# Patient Record
Sex: MALE | Race: WHITE | NOT HISPANIC OR LATINO | Employment: OTHER | URBAN - METROPOLITAN AREA
[De-identification: names, ages, dates, MRNs, and addresses within clinical notes are randomized per-mention and may not be internally consistent; named-entity substitution may affect disease eponyms.]

---

## 2019-02-02 ENCOUNTER — HOSPITAL ENCOUNTER (EMERGENCY)
Facility: HOSPITAL | Age: 39
Discharge: HOME | End: 2019-02-02
Attending: EMERGENCY MEDICINE | Admitting: EMERGENCY MEDICINE

## 2019-02-02 VITALS
HEART RATE: 72 BPM | DIASTOLIC BLOOD PRESSURE: 84 MMHG | OXYGEN SATURATION: 99 % | RESPIRATION RATE: 14 BRPM | TEMPERATURE: 98.3 F | SYSTOLIC BLOOD PRESSURE: 147 MMHG

## 2019-02-02 DIAGNOSIS — S61.412A LACERATION OF LEFT HAND: Primary | ICD-10-CM

## 2019-02-02 PROCEDURE — 99282 EMERGENCY DEPT VISIT SF MDM: CPT

## 2019-02-02 PROCEDURE — 90715 TDAP VACCINE 7 YRS/> IM: CPT | Performed by: PHYSICIAN ASSISTANT

## 2019-02-02 PROCEDURE — 90471 IMMUNIZATION ADMIN: CPT

## 2019-02-02 RX ORDER — LIDOCAINE HYDROCHLORIDE 20 MG/ML
5 INJECTION, SOLUTION EPIDURAL; INFILTRATION; INTRACAUDAL; PERINEURAL ONCE
Status: COMPLETED | OUTPATIENT
Start: 2019-02-02 | End: 2019-02-02

## 2019-02-02 RX ORDER — GINSENG 100 MG
1 CAPSULE ORAL ONCE
Status: COMPLETED | OUTPATIENT
Start: 2019-02-02 | End: 2019-02-02

## 2019-02-02 RX ADMIN — LIDOCAINE HYDROCHLORIDE 5 ML: 20 INJECTION, SOLUTION EPIDURAL; INFILTRATION; INTRACAUDAL; PERINEURAL at 21:23

## 2019-02-02 RX ADMIN — TETANUS TOXOID, REDUCED DIPHTHERIA TOXOID AND ACELLULAR PERTUSSIS VACCINE, ADSORBED 0.5 ML: 5; 2.5; 8; 8; 2.5 SUSPENSION INTRAMUSCULAR at 22:21

## 2019-02-02 RX ADMIN — BACITRACIN 1 SMALL APPLICATION: 500 OINTMENT TOPICAL at 21:23

## 2019-02-03 NOTE — DISCHARGE INSTRUCTIONS
Watch for signs of infection (redness, warmth, swelling, pus or drainage, streaking redness, fevers, pain)  Do not submerge the area in a bathtub or pool, keep area clean and dry  follow-up with the family clinic, family doctor or [facility] in 10-12 days for wound recheck and possible removal of stitches unless develops signs of infection before the 10-12 days  Care For Your Stitches   WHAT YOU NEED TO KNOW:   Stitches, or sutures, are used to close cuts and wounds on the skin  Stitches need to be removed after your wound has healed  DISCHARGE INSTRUCTIONS:   Return to the emergency department if:   · Your stitches come apart  · Blood soaks through your bandages  · You suddenly cannot move your injured joint  · You have sudden numbness around your wound  · You see red streaks coming from your wound  Contact your healthcare provider if:   · You have a fever and chills  · Your wound is red, warm, swollen, or leaking pus  · There is a bad smell coming from your wound  · You have increased pain in the wound area  · You have questions or concerns about your condition or care  Care for your stitches:  Keep your stitches clean and dry  You may need to cover your stitches with a bandage for 24 to 48 hours, or as directed  Do not bump or hit the suture area, as this could open the wound  Do not trim or shorten the ends of your stitches  If they rub on your clothing, put a gauze bandage between the stitches and your clothes  Clean your wound:  Carefully wash your wound with soap and water  For mouth and lip wounds, rinse your mouth after meals and at bedtime  Ask your healthcare provider what to use to rinse your mouth  If you have a scalp wound, you may gently wash your hair every 2 days with mild shampoo  Do not use hair products, such as hair spray  Help your wound heal:   · Elevate  your wound above the level of your heart as often as you can   This will help decrease swelling and pain  Prop your wound on pillows or blankets to keep it elevated comfortably  · Limit activity  Do not stretch the skin around your wound  This will help prevent bleeding and swelling of the wound area  Follow up with your healthcare provider as directed: You may need to return to have your stitches removed  Write down your questions so you remember to ask them during your visits  © 2017 [employer] Information is for End User's use only and may not be sold, redistributed or otherwise used for commercial purposes  All illustrations and images included in CareNotes® are the copyrighted property of [employer] , [employer]  or [employer]el  The above information is an  only  It is not intended as medical advice for individual conditions or treatments  Talk to your doctor, nurse or pharmacist before following any medical regimen to see if it is safe and effective for you

## 2019-02-03 NOTE — ED PROVIDER NOTES
History  Chief Complaint   Patient presents with    Finger Laceration     lac to left 1st digit circular saw      25-year-old male presents the ER with lacerations to the left hand and thumb  Patient states that he was working with a circular saw at home doing home improvement projects when a piece of molding that he was cutting jumped and he cut his hand  Patient states that at 1st he did not think lacerations were that bad and he cleaned his lacerations well before covering them with bandaid  Patient states that he noticed that had continued bleeding and when he looked at the lacerations again noticed they were wider than he had originally seen them and decided to come in to be seen  Patient is unsure of his last tetanus shot but he believes that it was over 5 years ago  Patient is right-handed  Patient states he has not taken anything for pain at this time  None       Past Medical History:   Diagnosis Date    No known health problems        Past Surgical History:   Procedure Laterality Date    NO PAST SURGERIES         History reviewed  No pertinent family history  I have reviewed and agree with the history as documented  Social History     Tobacco Use    Smoking status: Never Smoker    Smokeless tobacco: Never Used   Substance Use Topics    Alcohol use: No    Drug use: No        Review of Systems   Constitutional: Negative for chills and fever  Respiratory: Negative for chest tightness, shortness of breath and wheezing  Cardiovascular: Negative for chest pain and palpitations  Gastrointestinal: Negative for abdominal pain, nausea and vomiting  Skin: Positive for wound  Negative for rash  Neurological: Negative for dizziness, weakness, light-headedness, numbness and headaches  All other systems reviewed and are negative  Physical Exam  Physical Exam   Constitutional: He appears well-developed and well-nourished  No distress  HENT:   Head: Normocephalic and atraumatic  Eyes: Conjunctivae are normal    Neck: Normal range of motion  Cardiovascular: Normal rate and intact distal pulses  Pulmonary/Chest: Effort normal    Musculoskeletal: Normal range of motion  Left hand: He exhibits laceration  He exhibits normal range of motion, no tenderness, no bony tenderness, normal two-point discrimination, normal capillary refill and no swelling  Normal sensation noted  Normal strength noted  Hands:  Neurological: He is alert  Skin: Skin is warm and dry  Capillary refill takes less than 2 seconds  Laceration (3 lacerations to the thumb and radial aspect of hand, laceration lengths were 3 cm, 1 5 cm and 1 cm) noted  He is not diaphoretic  No active bleeding noted at laceration sites at this time  Patient has full range of motion of thumb and hand without pain  Laceration noted to dorsal aspect of hand at PIP joint does not open with flexion joint, no active bleeding and does not need to be sutured at this time  Neurovascularly intact, cap refill < 2 seconds, no decreased sensation noted  Nursing note and vitals reviewed            Vital Signs  ED Triage Vitals [02/02/19 2041]   Temperature Pulse Respirations Blood Pressure SpO2   98 3 °F (36 8 °C) 72 14 147/84 99 %      Temp Source Heart Rate Source Patient Position - Orthostatic VS BP Location FiO2 (%)   Temporal -- Sitting Right arm --      Pain Score       5           Vitals:    02/02/19 2041   BP: 147/84   Pulse: 72   Patient Position - Orthostatic VS: Sitting       Visual Acuity      ED Medications  Medications   lidocaine (PF) (XYLOCAINE-MPF) 2 % injection 5 mL (5 mL Infiltration Given by Other 2/2/19 2123)   bacitracin topical ointment 1 small application (1 small application Topical Given by Other 2/2/19 2123)   tetanus-diphtheria-acellular pertussis (BOOSTRIX) IM injection 0 5 mL (0 5 mL Intramuscular Given 2/2/19 2221)       Diagnostic Studies  Results Reviewed     None                 No orders to display Procedures  Lac Repair  Date/Time: 2/2/2019 10:04 PM  Performed by: David Segovia by: Madonna Saint   Consent: Verbal consent obtained  Risks and benefits: risks, benefits and alternatives were discussed  Consent given by: patient  Patient understanding: patient states understanding of the procedure being performed  Patient consent: the patient's understanding of the procedure matches consent given  Patient identity confirmed: verbally with patient  Time out: Immediately prior to procedure a "time out" was called to verify the correct patient, procedure, equipment, support staff and site/side marked as required  Body area: upper extremity  Location details: left hand  Laceration length: 5 5 cm  Foreign bodies: no foreign bodies  Tendon involvement: none  Nerve involvement: none  Vascular damage: no  Anesthesia: local infiltration    Anesthesia:  Local Anesthetic: lidocaine 2% without epinephrine  Anesthetic total: 3 mL      Procedure Details:  Preparation: Patient was prepped and draped in the usual sterile fashion  Irrigation solution: saline  Irrigation method: syringe  Amount of cleaning: standard  Debridement: none  Degree of undermining: none  Skin closure: 5-0 nylon and Steri-Strips  Number of sutures: 9  Technique: simple  Approximation: close  Approximation difficulty: simple  Dressing: antibiotic ointment (band-aid)  Patient tolerance: Patient tolerated the procedure well with no immediate complications  Comments: Pt has 3 lacerations  1 5 cm and 3 cm that received sutures 3 sutures for the 1 5 cm laceration and 6 sutures for the 3 cm laceration  The 3rd laceration located on the R thumb was not open or bleeding and did not need sutures  Steri-strips were placed over the area  Area was Neurovascularly intact, cap refill < 2 seconds, no decreased sensation noted after the procedure was completed               Phone Contacts  ED Phone Contact    ED Course MDM  Number of Diagnoses or Management Options  Laceration of left hand: new and does not require workup  Patient Progress  Patient progress: stable      Disposition  Final diagnoses:   Laceration of left hand     Time reflects when diagnosis was documented in both MDM as applicable and the Disposition within this note     Time User Action Codes Description Comment    2/2/2019 10:08 PM Nimisha Hager [[id number]] Laceration of left hand       ED Disposition     ED Disposition Condition Date/Time Comment    Discharge  Sat Feb 2, 2019 10:09 PM Lois Hein discharge to home/self care  Condition at discharge: Stable        Follow-up Information     Follow up With Specialties Details Why Contact Info Additional Information    [facility] Go in 1 week For wound re-check, For suture removal [address], [place] [address] [facility]  [phone] Via the [address] [facility] [address] toward [facility]  Take the [facility] [address]  Keep right and follow signs for [address]/ [place]  Merge onto [address] [place] [address]  In a half mile, take the exit for [address] toward [facility]  In 0 7 miles take the [facility] [employer]  Merge onto [facility]  In 500 feet, turn left on [address] and drive 0 3 miles  [address] will be on your left  [address] toward [place]  Take the [facility] [employer]  Merge onto [facility]  In 500 feet, turn left on [address] and drive 0 3 miles  [address] will be on your left  [address] towards [facility]  In 0 7 miles take the [facility] [employer]  Merge onto [facility]  In 500 feet, turn left on [address] and drive 0 3 miles  [address] will be on your left            There are no discharge medications for this patient  No discharge procedures on file      ED Provider  Electronically Signed by           Tiffani Pearl PA-C  02/11/19 7546

## 2021-02-18 DIAGNOSIS — Z23 ENCOUNTER FOR IMMUNIZATION: ICD-10-CM

## 2021-02-22 ENCOUNTER — IMMUNIZATIONS (OUTPATIENT)
Dept: FAMILY MEDICINE CLINIC | Facility: HOSPITAL | Age: 41
End: 2021-02-22

## 2021-02-22 DIAGNOSIS — Z23 ENCOUNTER FOR IMMUNIZATION: Primary | ICD-10-CM

## 2021-02-22 PROCEDURE — 91300 SARS-COV-2 / COVID-19 MRNA VACCINE (PFIZER-BIONTECH) 30 MCG: CPT

## 2021-02-22 PROCEDURE — 0001A SARS-COV-2 / COVID-19 MRNA VACCINE (PFIZER-BIONTECH) 30 MCG: CPT

## 2021-03-15 ENCOUNTER — IMMUNIZATIONS (OUTPATIENT)
Dept: FAMILY MEDICINE CLINIC | Facility: HOSPITAL | Age: 41
End: 2021-03-15

## 2021-03-15 DIAGNOSIS — Z23 ENCOUNTER FOR IMMUNIZATION: Primary | ICD-10-CM

## 2021-03-15 PROCEDURE — 91300 SARS-COV-2 / COVID-19 MRNA VACCINE (PFIZER-BIONTECH) 30 MCG: CPT

## 2021-03-15 PROCEDURE — 0002A SARS-COV-2 / COVID-19 MRNA VACCINE (PFIZER-BIONTECH) 30 MCG: CPT
